# Patient Record
Sex: MALE | Race: WHITE | Employment: FULL TIME | URBAN - NONMETROPOLITAN AREA
[De-identification: names, ages, dates, MRNs, and addresses within clinical notes are randomized per-mention and may not be internally consistent; named-entity substitution may affect disease eponyms.]

---

## 2018-11-15 ENCOUNTER — APPOINTMENT (OUTPATIENT)
Dept: GENERAL RADIOLOGY | Age: 48
End: 2018-11-15
Payer: MEDICARE

## 2018-11-15 ENCOUNTER — HOSPITAL ENCOUNTER (EMERGENCY)
Age: 48
Discharge: HOME | End: 2018-11-15
Payer: MEDICARE

## 2018-11-15 VITALS
TEMPERATURE: 97.8 F | HEART RATE: 79 BPM | RESPIRATION RATE: 18 BRPM | SYSTOLIC BLOOD PRESSURE: 143 MMHG | OXYGEN SATURATION: 96 % | DIASTOLIC BLOOD PRESSURE: 91 MMHG

## 2018-11-15 DIAGNOSIS — S46.911A SHOULDER STRAIN, RIGHT, INITIAL ENCOUNTER: Primary | ICD-10-CM

## 2018-11-15 PROCEDURE — 73030 X-RAY EXAM OF SHOULDER: CPT

## 2018-11-15 PROCEDURE — 99283 EMERGENCY DEPT VISIT LOW MDM: CPT

## 2018-11-15 RX ORDER — IBUPROFEN 600 MG/1
600 TABLET ORAL 4 TIMES DAILY PRN
Qty: 30 TABLET | Refills: 0 | Status: SHIPPED | OUTPATIENT
Start: 2018-11-15

## 2018-11-15 ASSESSMENT — PAIN DESCRIPTION - LOCATION: LOCATION: SHOULDER

## 2018-11-15 ASSESSMENT — PAIN DESCRIPTION - ORIENTATION: ORIENTATION: RIGHT

## 2018-11-15 ASSESSMENT — PAIN SCALES - GENERAL: PAINLEVEL_OUTOF10: 5

## 2018-11-15 ASSESSMENT — PAIN DESCRIPTION - PAIN TYPE: TYPE: ACUTE PAIN

## 2018-11-15 NOTE — ED PROVIDER NOTES
[facility] ED  eMERGENCY dEPARTMENT eNCOUnter      Pt Name: Clint Watson  MRN: [id number]  Birthdate 1970  Date of evaluation: 11/15/2018  Provider: Timi Abernathy PA-C,    CHIEF COMPLAINT       Chief Complaint   Patient presents with    Shoulder Injury     right shoulder, injured last month hasnt gotten better       HISTORY OF PRESENT ILLNESS    Clint Watson is a 46 y.o. male who presents to theemergency department from home. Patient states that he fell approximately one month injuring his right shoulder loratadine follow-up through states that he continues to have the pain in the right shoulder. Patient's pain is achy in nature and worse with certain movements. Denies any numbness or paresthesias decreased range of motion any other complaints or trauma. [employer] and Nursing notes were reviewed by myself. Any discrepancies are addressed above. PAST MEDICAL HISTORY     Past Medical History:   Diagnosis Date    Chronic back pain     Epigastric pain     GERD (gastroesophageal reflux disease)     Nausea & vomiting        SURGICAL HISTORY       Past Surgical History:   Procedure Laterality Date    DIAPHRAGMATIC HERNIA REPAIR  2010       CURRENT MEDICATIONS       Previous Medications    PANTOPRAZOLE SODIUM (PROTONIX) 40 MG PACK PACKET    Take 40 mg by mouth daily. RANITIDINE HCL (RANITIDINE 75 PO)    Take 150 mg by mouth       ALLERGIES     Patient has no known allergies. FAMILY HISTORY     No family history on file.      SOCIAL HISTORY       Social History     Social History    Marital status:      Spouse name: N/A    Number of children: N/A    Years of education: N/A     Social History Main Topics    Smoking status: Never Smoker    Smokeless tobacco: Former User      Comment: only used for a short time    Alcohol use Yes      Comment: rarely    Drug use: Yes     Types: Cocaine      Comment: recovering, last use 2 weeks from 02/2015    Sexual activity: Not on

## 2020-11-04 ENCOUNTER — HOSPITAL ENCOUNTER (OUTPATIENT)
Dept: LAB | Age: 50
Setting detail: SPECIMEN
Discharge: HOME | End: 2020-11-04
Payer: MEDICARE

## 2020-11-04 PROCEDURE — U0003 INFECTIOUS AGENT DETECTION BY NUCLEIC ACID (DNA OR RNA); SEVERE ACUTE RESPIRATORY SYNDROME CORONAVIRUS 2 (SARS-COV-2) (CORONAVIRUS DISEASE [COVID-19]), AMPLIFIED PROBE TECHNIQUE, MAKING USE OF HIGH THROUGHPUT TECHNOLOGIES AS DESCRIBED BY CMS-2020-01-R: HCPCS

## 2020-11-04 PROCEDURE — C9803 HOPD COVID-19 SPEC COLLECT: HCPCS

## 2020-11-05 LAB — SARS-COV-2, NAA: NOT DETECTED

## 2021-01-15 ENCOUNTER — TELEPHONE (OUTPATIENT)
Dept: PAIN MANAGEMENT | Age: 51
End: 2021-01-15

## 2021-02-04 NOTE — TELEPHONE ENCOUNTER
Ready for review       Initial Intake for Pain Management Clinic:  Guilherme Anderson (44 y.o., male)    : 1970     Which Provider sent Referral Dr. Kylah Barillas   What is your address (include city,state) Don't have an address right now    Purpose for Referral Acupuncture   Will all medication management and therapy for pain be managed by Dr. Lola Tellez? Yes       Chief Complaint Middle of lower back  When did your pain start? Awhile ago    What is your pain level today?  (0-10)     6 or 7 How did you find out about us or who sent you?     What do you feel caused your pain? 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 Constant, annoying, very uncomfortable    What makes your pain better? Standing really straight and stretching it  What makes your pain worse? 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 *If yes and they were from somewhere other than a [facility] facility - please bring copies of reports with you Yes- [facility]  Have you had any other tests done? *If yes and they were from somewhere other than a [facility]) facility - please bring copies of reports with you No    Have you had any surgeries specific to your pain? I've had nerve burning  Have you had any shots/injections for your pain? Injections    Have you tried any therapies for your pain? If yes, what?  (I.e. PT, OT, Massage, Aqua, Chiropractic, TENS)   Dr. Carlie Dee in [place] but it never helped much  What doctors have you seen for your pain and what did they do for you? [facility]- injections   Dr. Kylah Barillas - refer here   Dr. Ramirez Gone    What medications have you tried? Norco Do you have any medication allergies? No    Do you drink alcohol? No Do you smoke? No   Any history of substance abuse? No Are you employed?  Yes What do you want to this treatment to do for you?  (I.E. Increase activity, return to work, decrease pain) Take the pain away. Please remind patient to bring ALL medications into clinic appointment.